# Patient Record
Sex: FEMALE | Race: WHITE | NOT HISPANIC OR LATINO
[De-identification: names, ages, dates, MRNs, and addresses within clinical notes are randomized per-mention and may not be internally consistent; named-entity substitution may affect disease eponyms.]

---

## 2023-12-19 ENCOUNTER — APPOINTMENT (OUTPATIENT)
Dept: OBGYN | Facility: CLINIC | Age: 28
End: 2023-12-19
Payer: COMMERCIAL

## 2023-12-19 ENCOUNTER — NON-APPOINTMENT (OUTPATIENT)
Age: 28
End: 2023-12-19

## 2023-12-19 VITALS
SYSTOLIC BLOOD PRESSURE: 122 MMHG | HEIGHT: 65 IN | BODY MASS INDEX: 19.16 KG/M2 | DIASTOLIC BLOOD PRESSURE: 82 MMHG | HEART RATE: 90 BPM | OXYGEN SATURATION: 99 % | WEIGHT: 115 LBS

## 2023-12-19 DIAGNOSIS — A74.9 CHLAMYDIAL INFECTION, UNSPECIFIED: ICD-10-CM

## 2023-12-19 DIAGNOSIS — Z80.3 FAMILY HISTORY OF MALIGNANT NEOPLASM OF BREAST: ICD-10-CM

## 2023-12-19 DIAGNOSIS — Z78.9 OTHER SPECIFIED HEALTH STATUS: ICD-10-CM

## 2023-12-19 DIAGNOSIS — Z80.0 FAMILY HISTORY OF MALIGNANT NEOPLASM OF DIGESTIVE ORGANS: ICD-10-CM

## 2023-12-19 DIAGNOSIS — N89.8 OTHER SPECIFIED NONINFLAMMATORY DISORDERS OF VAGINA: ICD-10-CM

## 2023-12-19 DIAGNOSIS — Z11.3 ENCOUNTER FOR SCREENING FOR INFECTIONS WITH A PREDOMINANTLY SEXUAL MODE OF TRANSMISSION: ICD-10-CM

## 2023-12-19 DIAGNOSIS — Z01.419 ENCOUNTER FOR GYNECOLOGICAL EXAMINATION (GENERAL) (ROUTINE) W/OUT ABNORMAL FINDINGS: ICD-10-CM

## 2023-12-19 DIAGNOSIS — Z30.09 ENCOUNTER FOR OTHER GENERAL COUNSELING AND ADVICE ON CONTRACEPTION: ICD-10-CM

## 2023-12-19 PROBLEM — Z00.00 ENCOUNTER FOR PREVENTIVE HEALTH EXAMINATION: Status: ACTIVE | Noted: 2023-12-19

## 2023-12-19 PROCEDURE — 36415 COLL VENOUS BLD VENIPUNCTURE: CPT

## 2023-12-19 PROCEDURE — 99385 PREV VISIT NEW AGE 18-39: CPT

## 2023-12-19 NOTE — PLAN
[FreeTextEntry1] : annual ;pap and sti tests  got hpv vaccine suspect a vaginal infection: swab test sent likely will want copper T iud r/b/a discussed did not like hormones: mood changes

## 2023-12-19 NOTE — HISTORY OF PRESENT ILLNESS
[N] : Patient denies prior pregnancies [Normal Amount/Duration] :  normal amount and duration [Currently Active] : currently active [Men] : men [Yes] : Yes [Condoms] : Condoms [FreeTextEntry1] : 12/2023

## 2023-12-19 NOTE — PHYSICAL EXAM
[Chaperone Present] : A chaperone was present in the examining room during all aspects of the physical examination [Appropriately responsive] : appropriately responsive [Alert] : alert [No Acute Distress] : no acute distress [No Lymphadenopathy] : no lymphadenopathy [Regular Rate Rhythm] : regular rate rhythm [No Murmurs] : no murmurs [Clear to Auscultation B/L] : clear to auscultation bilaterally [Soft] : soft [Non-tender] : non-tender [Non-distended] : non-distended [No HSM] : No HSM [No Lesions] : no lesions [No Mass] : no mass [Oriented x3] : oriented x3 [Examination Of The Breasts] : a normal appearance [No Masses] : no breast masses were palpable [Labia Majora] : normal [Labia Minora] : normal [Discharge] : a  ~M vaginal discharge was present [Heavy] : heavy [White] : white [Curds] : curd-like [Normal] : normal [Uterine Adnexae] : normal [FreeTextEntry4] : suspect vaginitis; swab test sent

## 2023-12-19 NOTE — COUNSELING
[Contraception/ Emergency Contraception/ Safe Sexual Practices] : contraception, emergency contraception, safe sexual practices [STD (testing, results, tx)] : STD (testing, results, tx) [HPV Vaccine] : HPV Vaccine [Medication Management] : medication management

## 2023-12-20 LAB
C TRACH RRNA SPEC QL NAA+PROBE: NOT DETECTED
HBV SURFACE AG SER QL: NONREACTIVE
HCV AB SER QL: NONREACTIVE
HCV S/CO RATIO: 0.15 S/CO
HIV1+2 AB SPEC QL IA.RAPID: NONREACTIVE
N GONORRHOEA RRNA SPEC QL NAA+PROBE: NOT DETECTED
SOURCE TP AMPLIFICATION: NORMAL
T PALLIDUM AB SER QL IA: NEGATIVE

## 2023-12-23 ENCOUNTER — TRANSCRIPTION ENCOUNTER (OUTPATIENT)
Age: 28
End: 2023-12-23

## 2023-12-27 ENCOUNTER — TRANSCRIPTION ENCOUNTER (OUTPATIENT)
Age: 28
End: 2023-12-27

## 2023-12-27 LAB — CYTOLOGY CVX/VAG DOC THIN PREP: NORMAL
